# Patient Record
Sex: MALE | Race: WHITE
[De-identification: names, ages, dates, MRNs, and addresses within clinical notes are randomized per-mention and may not be internally consistent; named-entity substitution may affect disease eponyms.]

---

## 2019-03-18 NOTE — RAD
THORACIC SPINE THREE VIEWS:

 

History: 

Low back pain. 

 

FINDINGS: 

There is a lower thoracic vertebral body seen on the lateral view which shows vertical height loss an
teriorly, evidence for a compression fracture. I favor this to be T11. Follow up CT scan is recommend
ed for further assessment. 

 

IMPRESSION: 

Compression or burst type fracture of one of the lower thoracic vertebral bodies, I favor this to be 
T11. 

 

Findings were discussed with Dr. Chandu Narayan at 5:01 p.m. He indicated that the patient gave a hist
ory of having a prior fracture, but is still having pain. A follow up thoracic spine MRI is recommend
ed for further assessment of this. 

 

Code CR

 

POS: [facility]

## 2019-04-10 NOTE — MRI
MRI OF THE LUMBAR SPINE WITHOUT CONTRAST:

 

INDICATION: 

History of a closed fracture of T11 with also a fracture of T12.  This occurred 2-1/2 years ago.  The
 patient has had low back pain since.  No recent trauma is reported.  No prior surgeries are reported
.

 

TECHNIQUE: 

Multiplanar, multisequence MR images were obtained of the lumbar spine without IV contrast.  No radio
graphic or MR comparisons are available.  Five lumbar-type vertebral bodies are assumed for the purpo
ses of this exam.

 

FINDINGS: 

The conus is seen to terminate at approximately T12.

 

There is a suggestion of a mild chronic-appearing wedge abnormality involving T11.

 

At L5-S1, there is no appreciable central canal or neural foraminal narrowing.

 

At L4-5, there is mild facet joint degenerative change, but no appreciable central canal or neural fo
raminal narrowing.

 

At L3-4, there is mild facet joint degenerative change and a broad-based bulge, but no appreciable ce
ntral canal or neural foraminal narrowing.

 

At L2-3, there is mild facet joint degenerative change a mild broad-based bulge, but no appreciable c
entral canal or neural foraminal narrowing.

 

At L1-L2, there is no appreciable central canal or neural foraminal narrowing.

 

At T12-L1, there is no appreciable central canal or neural foraminal narrowing.

 

At T11-T12, there is a left paracentral disk protrusion causing mild effacement of the left ventral l
ateral aspect of the subarachnoid space.  There is potential for mild contact of the left ventral lat
eral spinal cord.  There is no definite spinal cord signal abnormality.  The visualized neural forame
n are patent.

 

IMPRESSION: 

1.  Left paracentral disk protrusion at T11-T12 causing mild ventral effacement of the left ventral l
ateral subarachnoid space with potential for contact of the spinal cord.

 

2.  Mild multilevel spondylosis of the lumbar spine.

 

POS: [facility]

## 2023-01-07 ENCOUNTER — HOSPITAL ENCOUNTER (EMERGENCY)
Dept: HOSPITAL 92 - ERS | Age: 33
Discharge: HOME | End: 2023-01-07
Payer: COMMERCIAL

## 2023-01-07 DIAGNOSIS — K11.20: Primary | ICD-10-CM

## 2023-01-07 LAB
ALBUMIN SERPL BCG-MCNC: 4.3 G/DL (ref 3.5–5)
ALP SERPL-CCNC: 62 U/L (ref 40–110)
ALT SERPL W P-5'-P-CCNC: 27 U/L (ref 8–55)
ANION GAP SERPL CALC-SCNC: 10 MMOL/L (ref 10–20)
AST SERPL-CCNC: 21 U/L (ref 5–34)
BASOPHILS # BLD AUTO: 0.1 THOU/UL (ref 0–0.2)
BASOPHILS NFR BLD AUTO: 0.7 % (ref 0–1)
BILIRUB SERPL-MCNC: 0.9 MG/DL (ref 0.2–1.2)
BUN SERPL-MCNC: 12 MG/DL (ref 8.9–20.6)
CALCIUM SERPL-MCNC: 9.2 MG/DL (ref 7.8–10.44)
CHLORIDE SERPL-SCNC: 105 MMOL/L (ref 98–107)
CO2 SERPL-SCNC: 29 MMOL/L (ref 22–29)
CREAT CL PREDICTED SERPL C-G-VRATE: 0 ML/MIN (ref 70–130)
EOSINOPHIL # BLD AUTO: 0.3 THOU/UL (ref 0–0.7)
EOSINOPHIL NFR BLD AUTO: 3.1 % (ref 0–10)
GLOBULIN SER CALC-MCNC: 2.9 G/DL (ref 2.4–3.5)
GLUCOSE SERPL-MCNC: 83 MG/DL (ref 70–105)
HGB BLD-MCNC: 15.1 G/DL (ref 14–18)
LYMPHOCYTES # BLD: 1.3 THOU/UL (ref 1.2–3.4)
LYMPHOCYTES NFR BLD AUTO: 15.3 % (ref 21–51)
MCH RBC QN AUTO: 28.4 PG (ref 27–31)
MCV RBC AUTO: 85.1 FL (ref 78–98)
MONOCYTES # BLD AUTO: 0.8 THOU/UL (ref 0.11–0.59)
MONOCYTES NFR BLD AUTO: 9.4 % (ref 0–10)
NEUTROPHILS # BLD AUTO: 6 THOU/UL (ref 1.4–6.5)
NEUTROPHILS NFR BLD AUTO: 71.5 % (ref 42–75)
PLATELET # BLD AUTO: 282 10X3/UL (ref 130–400)
POTASSIUM SERPL-SCNC: 4.1 MMOL/L (ref 3.5–5.1)
RBC # BLD AUTO: 5.31 MILL/UL (ref 4.7–6.1)
SODIUM SERPL-SCNC: 140 MMOL/L (ref 136–145)
WBC # BLD AUTO: 8.3 10X3/UL (ref 4.8–10.8)

## 2023-01-07 PROCEDURE — 86735 MUMPS ANTIBODY: CPT

## 2023-01-07 PROCEDURE — 70491 CT SOFT TISSUE NECK W/DYE: CPT

## 2023-01-07 PROCEDURE — 80053 COMPREHEN METABOLIC PANEL: CPT

## 2023-01-07 PROCEDURE — 85025 COMPLETE CBC W/AUTO DIFF WBC: CPT

## 2023-01-07 PROCEDURE — 96374 THER/PROPH/DIAG INJ IV PUSH: CPT

## 2023-01-07 PROCEDURE — 36415 COLL VENOUS BLD VENIPUNCTURE: CPT

## 2024-12-10 ENCOUNTER — HOSPITAL ENCOUNTER (OUTPATIENT)
Dept: HOSPITAL 92 - SCSRAD | Age: 34
Discharge: HOME | End: 2024-12-10
Payer: COMMERCIAL

## 2024-12-10 DIAGNOSIS — R00.2: Primary | ICD-10-CM

## 2024-12-10 PROCEDURE — 71046 X-RAY EXAM CHEST 2 VIEWS: CPT

## 2025-02-07 ENCOUNTER — HOSPITAL ENCOUNTER (OUTPATIENT)
Dept: HOSPITAL 92 - BICRAD | Age: 35
Discharge: HOME | End: 2025-02-07
Attending: NURSE PRACTITIONER
Payer: COMMERCIAL

## 2025-02-07 DIAGNOSIS — M54.12: Primary | ICD-10-CM

## 2025-02-07 PROCEDURE — 72050 X-RAY EXAM NECK SPINE 4/5VWS: CPT
